# Patient Record
Sex: MALE | Race: WHITE | Employment: OTHER | URBAN - METROPOLITAN AREA
[De-identification: names, ages, dates, MRNs, and addresses within clinical notes are randomized per-mention and may not be internally consistent; named-entity substitution may affect disease eponyms.]

---

## 2024-01-01 ENCOUNTER — LAB REQUISITION (OUTPATIENT)
Dept: LAB | Facility: CLINIC | Age: 74
End: 2024-01-01
Payer: MEDICARE

## 2024-01-01 ENCOUNTER — DOCUMENTATION ONLY (OUTPATIENT)
Dept: GERIATRICS | Facility: CLINIC | Age: 74
End: 2024-01-01
Payer: MEDICARE

## 2024-01-01 ENCOUNTER — TELEPHONE (OUTPATIENT)
Dept: GERIATRICS | Facility: CLINIC | Age: 74
End: 2024-01-01
Payer: MEDICARE

## 2024-01-01 ENCOUNTER — TRANSITIONAL CARE UNIT VISIT (OUTPATIENT)
Dept: GERIATRICS | Facility: CLINIC | Age: 74
End: 2024-01-01
Payer: MEDICARE

## 2024-01-01 VITALS
OXYGEN SATURATION: 95 % | DIASTOLIC BLOOD PRESSURE: 79 MMHG | BODY MASS INDEX: 27.02 KG/M2 | WEIGHT: 193 LBS | HEIGHT: 71 IN | HEART RATE: 85 BPM | RESPIRATION RATE: 18 BRPM | SYSTOLIC BLOOD PRESSURE: 140 MMHG | TEMPERATURE: 98.2 F

## 2024-01-01 DIAGNOSIS — R13.10 DYSPHAGIA, UNSPECIFIED TYPE: ICD-10-CM

## 2024-01-01 DIAGNOSIS — Z85.118 HISTORY OF PRIMARY MALIGNANT NEOPLASM OF LUNG: ICD-10-CM
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
## 2024-01-01 DIAGNOSIS — D64.9 CHRONIC ANEMIA: ICD-10-CM

## 2024-01-01 DIAGNOSIS — G62.9 PERIPHERAL POLYNEUROPATHY: Primary | ICD-10-CM

## 2024-01-01 DIAGNOSIS — J96.11 CHRONIC RESPIRATORY FAILURE WITH HYPOXIA (H): ICD-10-CM

## 2024-01-01 DIAGNOSIS — F10.10 ALCOHOL ABUSE: ICD-10-CM

## 2024-01-01 DIAGNOSIS — R41.82 ALTERED MENTAL STATUS, UNSPECIFIED: ICD-10-CM

## 2024-01-01 DIAGNOSIS — R53.1 GENERALIZED WEAKNESS: ICD-10-CM

## 2024-01-01 DIAGNOSIS — E87.6 HYPOKALEMIA: ICD-10-CM

## 2024-01-01 DIAGNOSIS — F17.200 TOBACCO DEPENDENCE SYNDROME: ICD-10-CM

## 2024-01-01 DIAGNOSIS — I25.10 ATHEROSCLEROTIC HEART DISEASE OF NATIVE CORONARY ARTERY WITHOUT ANGINA PECTORIS: ICD-10-CM

## 2024-01-01 DIAGNOSIS — G62.9 PERIPHERAL POLYNEUROPATHY: ICD-10-CM

## 2024-01-01 DIAGNOSIS — R06.02 SHORTNESS OF BREATH: ICD-10-CM

## 2024-01-01 DIAGNOSIS — R05.1 ACUTE COUGH: ICD-10-CM

## 2024-01-01 DIAGNOSIS — J44.1 COPD WITH ACUTE EXACERBATION (H): ICD-10-CM
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
## 2024-01-01 DIAGNOSIS — F10.20 SEVERE ALCOHOL DEPENDENCE (H): ICD-10-CM

## 2024-01-01 LAB
ALBUMIN UR-MCNC: 30 MG/DL
ALBUMIN UR-MCNC: 30 MG/DL
ANION GAP SERPL CALCULATED.3IONS-SCNC: 11 MMOL/L (ref 7–15)
APPEARANCE UR: ABNORMAL
APPEARANCE UR: CLEAR
BACTERIA UR CULT: ABNORMAL
BILIRUB UR QL STRIP: NEGATIVE
BILIRUB UR QL STRIP: NEGATIVE
BUN SERPL-MCNC: 12.4 MG/DL (ref 8–23)
CALCIUM SERPL-MCNC: 9.2 MG/DL (ref 8.8–10.2)
CHLORIDE SERPL-SCNC: 100 MMOL/L (ref 98–107)
COLOR UR AUTO: ABNORMAL
COLOR UR AUTO: YELLOW
CREAT SERPL-MCNC: 0.64 MG/DL (ref 0.67–1.17)
DEPRECATED HCO3 PLAS-SCNC: 28 MMOL/L (ref 22–29)
EGFRCR SERPLBLD CKD-EPI 2021: >90 ML/MIN/1.73M2
ERYTHROCYTE [DISTWIDTH] IN BLOOD BY AUTOMATED COUNT: 14.2 % (ref 10–15)
GLUCOSE SERPL-MCNC: 99 MG/DL (ref 70–99)
GLUCOSE UR STRIP-MCNC: NEGATIVE MG/DL
GLUCOSE UR STRIP-MCNC: NEGATIVE MG/DL
HCT VFR BLD AUTO: 34.4 % (ref 40–53)
HGB BLD-MCNC: 10.5 G/DL (ref 13.3–17.7)
HGB UR QL STRIP: ABNORMAL
HGB UR QL STRIP: NEGATIVE
HYALINE CASTS: 2 /LPF
KETONES UR STRIP-MCNC: ABNORMAL MG/DL
KETONES UR STRIP-MCNC: ABNORMAL MG/DL
LEUKOCYTE ESTERASE UR QL STRIP: NEGATIVE
LEUKOCYTE ESTERASE UR QL STRIP: NEGATIVE
MCH RBC QN AUTO: 28.8 PG (ref 26.5–33)
MCHC RBC AUTO-ENTMCNC: 30.5 G/DL (ref 31.5–36.5)
MCV RBC AUTO: 94 FL (ref 78–100)
MUCOUS THREADS #/AREA URNS LPF: PRESENT /LPF
NITRATE UR QL: NEGATIVE
NITRATE UR QL: NEGATIVE
PH UR STRIP: 5 [PH] (ref 5–7)
PH UR STRIP: 5.5 [PH] (ref 5–7)
PLATELET # BLD AUTO: 391 10E3/UL (ref 150–450)
POTASSIUM SERPL-SCNC: 4 MMOL/L (ref 3.4–5.3)
RBC # BLD AUTO: 3.65 10E6/UL (ref 4.4–5.9)
RBC URINE: 0 /HPF
RBC URINE: 4 /HPF
SODIUM SERPL-SCNC: 139 MMOL/L (ref 135–145)
SP GR UR STRIP: 1.03 (ref 1–1.03)
SP GR UR STRIP: 1.03 (ref 1–1.03)
SQUAMOUS EPITHELIAL: <1 /HPF
UROBILINOGEN UR STRIP-MCNC: NORMAL MG/DL
UROBILINOGEN UR STRIP-MCNC: NORMAL MG/DL
WBC # BLD AUTO: 8.5 10E3/UL (ref 4–11)
WBC URINE: 1 /HPF
WBC URINE: 2 /HPF

## 2024-01-01 PROCEDURE — 87186 SC STD MICRODIL/AGAR DIL: CPT | Mod: ORL | Performed by: FAMILY MEDICINE

## 2024-01-01 PROCEDURE — 36415 COLL VENOUS BLD VENIPUNCTURE: CPT | Mod: ORL | Performed by: FAMILY MEDICINE

## 2024-01-01 PROCEDURE — 85027 COMPLETE CBC AUTOMATED: CPT | Mod: ORL | Performed by: FAMILY MEDICINE

## 2024-01-01 PROCEDURE — 99310 SBSQ NF CARE HIGH MDM 45: CPT | Performed by: NURSE PRACTITIONER

## 2024-01-01 PROCEDURE — 81001 URINALYSIS AUTO W/SCOPE: CPT | Mod: ORL | Performed by: FAMILY MEDICINE

## 2024-01-01 PROCEDURE — 99305 1ST NF CARE MODERATE MDM 35: CPT | Performed by: FAMILY MEDICINE

## 2024-01-01 PROCEDURE — 87086 URINE CULTURE/COLONY COUNT: CPT | Mod: ORL | Performed by: FAMILY MEDICINE

## 2024-01-01 PROCEDURE — P9604 ONE-WAY ALLOW PRORATED TRIP: HCPCS | Mod: ORL | Performed by: FAMILY MEDICINE

## 2024-01-01 PROCEDURE — 80048 BASIC METABOLIC PNL TOTAL CA: CPT | Mod: ORL | Performed by: FAMILY MEDICINE

## 2024-01-01 RX ORDER — MULTIVITAMIN
1 TABLET ORAL DAILY
COMMUNITY

## 2024-01-01 RX ORDER — LANOLIN ALCOHOL/MO/W.PET/CERES
100 CREAM (GRAM) TOPICAL DAILY
COMMUNITY

## 2024-01-01 RX ORDER — TRIAMCINOLONE ACETONIDE 1 MG/G
CREAM TOPICAL 2 TIMES DAILY
COMMUNITY

## 2024-01-01 RX ORDER — FOLIC ACID 1 MG/1
1 TABLET ORAL DAILY
COMMUNITY

## 2024-01-01 RX ORDER — GABAPENTIN 300 MG/1
900 CAPSULE ORAL 3 TIMES DAILY
COMMUNITY

## 2024-01-01 RX ORDER — TADALAFIL 5 MG/1
5 TABLET ORAL EVERY 24 HOURS
COMMUNITY

## 2024-01-01 RX ORDER — PANTOPRAZOLE SODIUM 40 MG/1
40 TABLET, DELAYED RELEASE ORAL DAILY
COMMUNITY

## 2024-01-01 RX ORDER — MAGNESIUM 200 MG
1000 TABLET ORAL DAILY
COMMUNITY

## 2024-01-01 RX ORDER — ALBUTEROL SULFATE 90 UG/1
2 AEROSOL, METERED RESPIRATORY (INHALATION) EVERY 6 HOURS PRN
COMMUNITY

## 2024-01-01 RX ORDER — GUAIFENESIN 600 MG/1
1200 TABLET, EXTENDED RELEASE ORAL 2 TIMES DAILY
COMMUNITY

## 2024-01-01 RX ORDER — CETIRIZINE HYDROCHLORIDE 10 MG/1
10 TABLET ORAL DAILY
COMMUNITY

## 2024-07-01 PROBLEM — K44.9 DIAPHRAGMATIC HERNIA: Status: ACTIVE | Noted: 2022-12-01

## 2024-07-01 PROBLEM — K31.9 DISORDER OF STOMACH: Status: ACTIVE | Noted: 2022-12-01

## 2024-07-01 PROBLEM — E87.6 HYPOKALEMIA: Status: ACTIVE | Noted: 2024-01-01

## 2024-07-01 PROBLEM — R63.4 ABNORMAL WEIGHT LOSS: Status: ACTIVE | Noted: 2017-09-18

## 2024-07-01 PROBLEM — K86.2 CYST OF PANCREAS: Status: ACTIVE | Noted: 2017-09-18
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
## 2024-07-01 PROBLEM — Z85.118 HISTORY OF PRIMARY MALIGNANT NEOPLASM OF LUNG: Status: ACTIVE | Noted: 2024-01-01

## 2024-07-01 PROBLEM — E83.42 HYPOMAGNESEMIA: Status: ACTIVE | Noted: 2024-01-01

## 2024-07-01 PROBLEM — K22.10 ULCER OF ESOPHAGUS: Status: ACTIVE | Noted: 2022-12-01

## 2024-07-01 PROBLEM — Z80.0 FAMILY HISTORY OF COLON CANCER: Status: ACTIVE | Noted: 2021-08-09

## 2024-07-01 PROBLEM — Z86.0100 HISTORY OF COLONIC POLYPS: Status: ACTIVE | Noted: 2019-02-05

## 2024-07-01 PROBLEM — J96.11 CHRONIC RESPIRATORY FAILURE WITH HYPOXIA (H): Status: ACTIVE | Noted: 2024-01-01

## 2024-07-01 PROBLEM — R74.8 ELEVATED ALKALINE PHOSPHATASE LEVEL: Status: ACTIVE | Noted: 2024-01-01

## 2024-07-01 PROBLEM — F10.10 ALCOHOL ABUSE: Status: ACTIVE | Noted: 2024-01-01

## 2024-07-01 PROBLEM — R93.89 ABNORMAL COMPUTED TOMOGRAPHY SCAN: Status: ACTIVE | Noted: 2017-09-18

## 2024-07-01 PROBLEM — R11.0 NAUSEA: Status: ACTIVE | Noted: 2024-01-01

## 2024-07-01 PROBLEM — G47.9 SLEEP DISTURBANCE: Status: ACTIVE | Noted: 2021-11-08

## 2024-07-01 PROBLEM — J44.1 COPD WITH ACUTE EXACERBATION (H): Status: ACTIVE | Noted: 2024-01-01

## 2024-07-01 PROBLEM — K51.40 INFLAMMATORY PSEUDOTUMOR OF COLON (H): Status: ACTIVE | Noted: 2022-08-11

## 2024-07-01 PROBLEM — I82.611 SUPERFICIAL VENOUS THROMBOSIS OF RIGHT UPPER EXTREMITY: Status: ACTIVE | Noted: 2021-11-19

## 2024-07-01 PROBLEM — L20.9 ATOPIC DERMATITIS: Status: ACTIVE | Noted: 2017-04-07

## 2024-07-01 PROBLEM — H61.009 CHONDRODERMATITIS NODULARIS HELICIS: Status: ACTIVE | Noted: 2017-04-07

## 2024-07-01 PROBLEM — R13.10 DYSPHAGIA: Status: ACTIVE | Noted: 2024-01-01

## 2024-07-01 PROBLEM — F10.20 SEVERE ALCOHOL DEPENDENCE (H): Status: ACTIVE | Noted: 2022-08-10

## 2024-07-01 PROBLEM — N52.9 ERECTILE DYSFUNCTION: Status: ACTIVE | Noted: 2021-08-09

## 2024-07-01 PROBLEM — D64.9 CHRONIC ANEMIA: Status: ACTIVE | Noted: 2024-01-01

## 2024-07-01 PROBLEM — K64.8 HEMORRHOIDS, INTERNAL: Status: ACTIVE | Noted: 2019-08-08

## 2024-07-01 PROBLEM — F17.200 TOBACCO DEPENDENCE SYNDROME: Status: ACTIVE | Noted: 2024-01-01

## 2024-07-01 PROBLEM — R91.1 SOLITARY PULMONARY NODULE: Status: ACTIVE | Noted: 2024-01-01

## 2024-07-01 PROBLEM — D12.2 BENIGN NEOPLASM OF ASCENDING COLON: Status: ACTIVE | Noted: 2019-02-07

## 2024-07-01 PROBLEM — F32.5 MAJOR DEPRESSIVE DISORDER WITH SINGLE EPISODE, IN REMISSION (H): Status: ACTIVE | Noted: 2019-06-24

## 2024-07-01 PROBLEM — C34.90 ADENOCARCINOMA, LUNG (H): Status: ACTIVE | Noted: 2023-06-01

## 2024-07-01 PROBLEM — G62.9 PERIPHERAL NEUROPATHY: Status: ACTIVE | Noted: 2024-01-01

## 2024-07-01 PROBLEM — R53.1 GENERALIZED WEAKNESS: Status: ACTIVE | Noted: 2024-01-01

## 2024-07-01 NOTE — LETTER
7/1/2024      West Wilson  [address]  [address]  [facility] [address]        [facility] GERIATRIC SERVICES       Patient West Wilson  MRN: [id number]        Reason for Visit     Chief Complaint   Patient presents with     Hospital F/U       Code Status     CPR/Full code     Assessment       Generalized weakness/ falls/  Patient admitted post welfare checks  He was found to be crawling on the floor with increasing weakness  Currently reports that his weakness is primarily due to neuropathy.  He is still not able to ambulate    Alcohol use disorder  Patient did not go through any significant alcohol withdrawal and has refused referral to treatment program.  Reports daily heavy alcohol use.  Not interested in any rehab program.  Cont B1 and B12 supplement    Peripheral neuropathy currently on gabapentin in high doses  May use tylenol as needed    Chronic anemia recheck labs    COPD with chronic hypoxic respiratory failure  Continue with oxygen at 3 L.  Cont MDI as ordered    Prior history of nicotine use reports he quit about a month ago.  He does not have any cravings and does not want to use a nicotine patch either    GERD cont PPI    Prior history of lung cancer status post wedge resection of his lung.  He follows up with pulmonary.      History     Patient is a very pleasant 74 year old male who is admitted to TCU  Patient admitted with generalized weakness and history of falls.  There was a welfare check and he was found to be crawling on the floor.  He has underlying history of alcohol use disorder but has refused any interventions.  Declined CD treatment also.  He has been drinking daily as per him at least 4 to 12 ounces of liquor.  Currently discharged for strengthening and rehab to the TCU      Past Medical & Surgical History     Alcohol use disorder  Anemia  COPD    Past Social History     Reviewed,  includes a history of 1 pack a day for 47 years of smoking  Reports he quit smoking a month  ago  Continues to have heavy alcohol use    Family History     Reviewed, and family history is not on file.    Medication List     Current Outpatient Medications   Medication Sig Dispense Refill     albuterol (PROAIR HFA/PROVENTIL HFA/VENTOLIN HFA) 108 (90 Base) MCG/ACT inhaler Inhale 2 puffs into the lungs every 6 hours as needed for shortness of breath, wheezing or cough       cetirizine (ZYRTEC) 10 MG tablet Take 10 mg by mouth daily       cyanocobalamin 1000 MCG sublingual tablet Place 1,000 mcg under the tongue daily       folic acid (FOLVITE) 1 MG tablet Take 1 mg by mouth daily       gabapentin (NEURONTIN) 300 MG capsule Take 900 mg by mouth 3 times daily       guaiFENesin (MUCINEX) 600 MG 12 hr tablet Take 1,200 mg by mouth 2 times daily       multiple vitamin  tablet TABS Take 1 tablet by mouth daily       pantoprazole (PROTONIX) 40 MG EC tablet Take 40 mg by mouth daily       tadalafil (CIALIS) 5 MG tablet Take 5 mg by mouth every 24 hours       thiamine (B-1) 100 MG tablet Take 100 mg by mouth daily       triamcinolone (KENALOG) 0.1 % external cream Apply topically 2 times daily       umeclidinium (INCRUSE ELLIPTA) 62.5 MCG/ACT inhaler Inhale 1 puff into the lungs daily       No current facility-administered medications for this visit.      MED REC REQUIRED  Post Medication Reconciliation Status:  Discharge medications reconciled, continue medications without change       Allergies     Allergies   Allergen Reactions     Penicillins Anaphylaxis       Review of Systems   A comprehensive review of 14 systems was done. Pertinent findings noted here and in history of present illness. All the rest negative.  Constitutional: Negative.  Negative for fever, chills, she has  activity change, appetite change and fatigue.   HENT: Negative for congestion and facial swelling.    Eyes: Negative for photophobia, redness and visual disturbance.   Respiratory: Negative for chest tightness.    On 3 l of O2  Has a chronic  "cough  Cardiovascular: Negative for chest pain, palpitations and leg swelling.   Gastrointestinal: Negative for nausea, diarrhea, constipation, blood in stool and abdominal distention.   Genitourinary: Negative.    Musculoskeletal: reports weakness and fall due to neuropathy  Skin: Negative.    Neurological: Negative for dizziness, tremors, syncope, has weakness, no light-headedness and headaches.   Hematological: Does not bruise/bleed easily.   Psychiatric/Behavioral: Negative.        Physical Exam   BP (!) 140/79   Pulse 85   Temp 98.2  F (36.8  C)   Resp 18   Ht 1.791 m (5' 10.5\")   Wt 87.5 kg (193 lb)   SpO2 95%   BMI 27.30 kg/m     On o2 3L  Constitutional: Oriented to person, place, and time and appears well-developed.   HEENT:  Normocephalic and atraumatic.  Eyes: Conjunctivae and EOM are normal. Pupils are equal, round, and reactive to light. No discharge.  No scleral icterus. Nose normal. Mouth/Throat: Oropharynx is clear and moist. No oropharyngeal exudate.    NECK: Normal range of motion. Neck supple. No JVD present. No tracheal deviation present. No thyromegaly present.   CARDIOVASCULAR: Normal rate, regular rhythm and intact distal pulses.  Exam reveals no gallop and no friction rub.  Systolic murmur present.  PULMONARY: Effort normal and breath sounds normal. No respiratory distress.No Wheezing or rales.  ABDOMEN: Soft. Bowel sounds are normal. No distension and no mass.  There is no tenderness. There is no rebound and no guarding. No HSM.  MUSCULOSKELETAL: Normal range of motion. Mild kyphosis, no tenderness.  Gait is ataxic  Pt is stiff ;reporting pain in legs  LYMPH NODES: Has no cervical, supraclavicular, axillary and groin adenopathy.   NEUROLOGICAL: Alert and oriented to person, place, and time. No cranial nerve deficit.  Normal muscle tone. Coordination normal.   GENITOURINARY: Deferred exam.  SKIN: Skin is warm and dry. No rash noted. No erythema. No pallor.   EXTREMITIES: No cyanosis, " no clubbing, trace edema. No Deformity.  PSYCHIATRIC: Normal mood, affect and behavior.  Appears not very well kept    Lab Results     Discharge lab on 6/27/2024 reviewed.  Creatinine was 0.7  Electrolytes were stable  Hemoglobin 10.7  Platelets 191 K            Electronically signed by    Melyssa Talamantes MD                            Sincerely,        FRANCISCO Perez

## 2024-07-01 NOTE — PROGRESS NOTES
[facility] GERIATRIC SERVICES       Patient West Wilson  MRN: [id number]        Reason for Visit     Chief Complaint   Patient presents with    Hospital F/U       Code Status     CPR/Full code     Assessment       Generalized weakness/ falls/  Patient admitted post welfare checks  He was found to be crawling on the floor with increasing weakness  Currently reports that his weakness is primarily due to neuropathy.  He is still not able to ambulate    Alcohol use disorder  Patient did not go through any significant alcohol withdrawal and has refused referral to treatment program.  Reports daily heavy alcohol use.  Not interested in any rehab program.  Cont B1 and B12 supplement    Peripheral neuropathy currently on gabapentin in high doses  May use tylenol as needed    Chronic anemia recheck labs    COPD with chronic hypoxic respiratory failure  Continue with oxygen at 3 L.  Cont MDI as ordered    Prior history of nicotine use reports he quit about a month ago.  He does not have any cravings and does not want to use a nicotine patch either    GERD cont PPI    Prior history of lung cancer status post wedge resection of his lung.  He follows up with pulmonary.      History     Patient is a very pleasant 74 year old male who is admitted to TCU  Patient admitted with generalized weakness and history of falls.  There was a welfare check and he was found to be crawling on the floor.  He has underlying history of alcohol use disorder but has refused any interventions.  Declined CD treatment also.  He has been drinking daily as per him at least 4 to 12 ounces of liquor.  Currently discharged for strengthening and rehab to the TCU      Past Medical & Surgical History     Alcohol use disorder  Anemia  COPD    Past Social History     Reviewed,  includes a history of 1 pack a day for 47 years of smoking  Reports he quit smoking a month ago  Continues to have heavy alcohol use    Family History     Reviewed, and family history  is not on file.    Medication List     Current Outpatient Medications   Medication Sig Dispense Refill    albuterol (PROAIR HFA/PROVENTIL HFA/VENTOLIN HFA) 108 (90 Base) MCG/ACT inhaler Inhale 2 puffs into the lungs every 6 hours as needed for shortness of breath, wheezing or cough      cetirizine (ZYRTEC) 10 MG tablet Take 10 mg by mouth daily      cyanocobalamin 1000 MCG sublingual tablet Place 1,000 mcg under the tongue daily      folic acid (FOLVITE) 1 MG tablet Take 1 mg by mouth daily      gabapentin (NEURONTIN) 300 MG capsule Take 900 mg by mouth 3 times daily      guaiFENesin (MUCINEX) 600 MG 12 hr tablet Take 1,200 mg by mouth 2 times daily      multiple vitamin  tablet TABS Take 1 tablet by mouth daily      pantoprazole (PROTONIX) 40 MG EC tablet Take 40 mg by mouth daily      tadalafil (CIALIS) 5 MG tablet Take 5 mg by mouth every 24 hours      thiamine (B-1) 100 MG tablet Take 100 mg by mouth daily      triamcinolone (KENALOG) 0.1 % external cream Apply topically 2 times daily      umeclidinium (INCRUSE ELLIPTA) 62.5 MCG/ACT inhaler Inhale 1 puff into the lungs daily       No current facility-administered medications for this visit.      MED REC REQUIRED  Post Medication Reconciliation Status:  Discharge medications reconciled, continue medications without change       Allergies     Allergies   Allergen Reactions    Penicillins Anaphylaxis       Review of Systems   A comprehensive review of 14 systems was done. Pertinent findings noted here and in history of present illness. All the rest negative.  Constitutional: Negative.  Negative for fever, chills, she has  activity change, appetite change and fatigue.   HENT: Negative for congestion and facial swelling.    Eyes: Negative for photophobia, redness and visual disturbance.   Respiratory: Negative for chest tightness.    On 3 l of O2  Has a chronic cough  Cardiovascular: Negative for chest pain, palpitations and leg swelling.   Gastrointestinal: Negative  "for nausea, diarrhea, constipation, blood in stool and abdominal distention.   Genitourinary: Negative.    Musculoskeletal: reports weakness and fall due to neuropathy  Skin: Negative.    Neurological: Negative for dizziness, tremors, syncope, has weakness, no light-headedness and headaches.   Hematological: Does not bruise/bleed easily.   Psychiatric/Behavioral: Negative.        Physical Exam   BP (!) 140/79   Pulse 85   Temp 98.2  F (36.8  C)   Resp 18   Ht 1.791 m (5' 10.5\")   Wt 87.5 kg (193 lb)   SpO2 95%   BMI 27.30 kg/m     On o2 3L  Constitutional: Oriented to person, place, and time and appears well-developed.   HEENT:  Normocephalic and atraumatic.  Eyes: Conjunctivae and EOM are normal. Pupils are equal, round, and reactive to light. No discharge.  No scleral icterus. Nose normal. Mouth/Throat: Oropharynx is clear and moist. No oropharyngeal exudate.    NECK: Normal range of motion. Neck supple. No JVD present. No tracheal deviation present. No thyromegaly present.   CARDIOVASCULAR: Normal rate, regular rhythm and intact distal pulses.  Exam reveals no gallop and no friction rub.  Systolic murmur present.  PULMONARY: Effort normal and breath sounds normal. No respiratory distress.No Wheezing or rales.  ABDOMEN: Soft. Bowel sounds are normal. No distension and no mass.  There is no tenderness. There is no rebound and no guarding. No HSM.  MUSCULOSKELETAL: Normal range of motion. Mild kyphosis, no tenderness.  Gait is ataxic  Pt is stiff ;reporting pain in legs  LYMPH NODES: Has no cervical, supraclavicular, axillary and groin adenopathy.   NEUROLOGICAL: Alert and oriented to person, place, and time. No cranial nerve deficit.  Normal muscle tone. Coordination normal.   GENITOURINARY: Deferred exam.  SKIN: Skin is warm and dry. No rash noted. No erythema. No pallor.   EXTREMITIES: No cyanosis, no clubbing, trace edema. No Deformity.  PSYCHIATRIC: Normal mood, affect and behavior.  Appears not very " well kept    Lab Results     Discharge lab on 6/27/2024 reviewed.  Creatinine was 0.7  Electrolytes were stable  Hemoglobin 10.7  Platelets 191 K            Electronically signed by    Melyssa Talamantes MD

## 2024-07-03 NOTE — TELEPHONE ENCOUNTER
[facility] Triage Nurse Telephone Encounter    Provider: JONEL Buck CNP  Facility: [facility]  Facility Type:  TCU    Caller: Alexus   Call Back Number: [phone]    Allergies:    Allergies   Allergen Reactions    Penicillins Anaphylaxis        Reason for call:   Family is concerned that the patient is confused today and does not know where he is. He is having some urinary frequency. /94 heart rate 98 oxygen 91% on 3 L temp 97.5 nursing requesting UA UC.     Verbal Order/Direction given by Provider: Obtain a UA/UC   CBC and BMP on 7/5 for confusion     Provider giving Order:  Melyssa Talamantes MD    Verbal Order given to: Alexus Valdes RN

## 2024-07-10 NOTE — PROGRESS NOTES
[facility] GERIATRICS    PRIMARY CARE PROVIDER AND CLINIC:  Provider Outside, No address on file  No chief complaint on file.     [place] Medical Record Number:  [id number]  Place of Service where encounter took place:  [facility] (U) [[id number]]    Hospital Course     West Wilson is a 74-year-old male with history of alcohol use, peripheral neuropathy, anemia, COPD with chronic hypoxic respiratory failure who presented to [facility] ED via EMS after a welfare check was called, admitted for evaluation of falls and weakness in setting of alcohol use.     Patient endorsed falling multiple times over the past few weeks, with increasing frequency in the 3 days prior to presentation. He noted his legs would feel weak and give out from under him. Eventually, he was too weak to get up and was crawling around on the floor. Of note, he drinks hard alcohol daily. His last drink was 6/25/2024 prior to arrival to the ED ~4pm. He has never experienced alcohol withdrawal seizures.     His ED workup revealed a magnesium of 1.1, K of 3.1, Hgb of 10.7, Vitamin B12 of 1320, UA obtained in ED. CK was 432. Head CT and CT of spine were negative. He was given electrolyte replacement and 1 L of NS fluids for hydration. He was admitted to observation.     Electrolytes normalized with supplementation. CK normalized with IV fluids.     He did not experience any significant alcohol withdrawal symptoms and declined referral for CD treatment. He continues to experience poorly controlled peripheral neuropathy despite recently increased dose of gabapentin. Discussed importance of cessation from alcohol.     PT and OT assessed patient and recommended discharge to TCU due to significant weakness. He will discharge to [facility] for ongoing therapy. Continue all previous home medications and follow up with PCP.       West Wilson  is a 74 year old  (1950), admitted to the above facility from  [facility].  Hospital stay 6/25/24 through 6/28/24..   Patient seen for initial NP visit in TCU today for ongoing medical concerns:  1. Fall, subsequent encounter    2. Generalized weakness    3. Chronic anemia    4. Chronic respiratory failure with hypoxia (H)    5. Peripheral polyneuropathy    6. Alcohol abuse    7. COPD with acute exacerbation (H)    8. Dysphagia, unspecified type    9. History of primary malignant neoplasm of lung    10. Hypokalemia    11. Acute cough          HPI:    Patient with increased weakness, deep, wet cough today. This is new. Admitted for falls/weakness after found on welfare check. History of alcohol use, COPD on home oxygen.   Denies HA, chest pain, palpitations, dizziness. HR elevated this morning, 114. BP stable so far in TCU. Reports some SOB, O2 this morning 83% on oxygen, coughing up phlegm, no Concerns with urination, no constipation, no pain. He does have a history of dysphagia, will ask speech therapy to see him. Reports no appetite. Sleeping okay. Reports not getting much therapy.   Lives at home alone in apartment, ambulates with walker.      CODE STATUS/ADVANCE DIRECTIVES DISCUSSION:  No Order  CPR/Full code   ALLERGIES:   Allergies   Allergen Reactions    Penicillins Anaphylaxis      PAST MEDICAL HISTORY: No past medical history on file.   PAST SURGICAL HISTORY:   has no past surgical history on file.  FAMILY HISTORY: family history is not on file.  SOCIAL HISTORY:     Patient's living condition: lives alone    Post Discharge Medication Reconciliation Status:   MED REC REQUIRED  Post Medication Reconciliation Status: discharge medications reconciled and changed, per note/orders       Current Outpatient Medications   Medication Sig Dispense Refill    albuterol (PROAIR HFA/PROVENTIL HFA/VENTOLIN HFA) 108 (90 Base) MCG/ACT inhaler Inhale 2 puffs into the lungs every 6 hours as needed for shortness of breath, wheezing or cough      cetirizine (ZYRTEC) 10 MG tablet Take 10 mg by mouth  daily      cyanocobalamin 1000 MCG sublingual tablet Place 1,000 mcg under the tongue daily      folic acid (FOLVITE) 1 MG tablet Take 1 mg by mouth daily      gabapentin (NEURONTIN) 300 MG capsule Take 900 mg by mouth 3 times daily      guaiFENesin (MUCINEX) 600 MG 12 hr tablet Take 1,200 mg by mouth 2 times daily      multiple vitamin  tablet TABS Take 1 tablet by mouth daily      pantoprazole (PROTONIX) 40 MG EC tablet Take 40 mg by mouth daily      tadalafil (CIALIS) 5 MG tablet Take 5 mg by mouth every 24 hours      thiamine (B-1) 100 MG tablet Take 100 mg by mouth daily      triamcinolone (KENALOG) 0.1 % external cream Apply topically 2 times daily      umeclidinium (INCRUSE ELLIPTA) 62.5 MCG/ACT inhaler Inhale 1 puff into the lungs daily       No current facility-administered medications for this visit.       ROS:  10 point ROS of systems including Constitutional, Eyes, Respiratory, Cardiovascular, Gastroenterology, Genitourinary, Integumentary, Musculoskeletal, Psychiatric were all negative except for pertinent positives noted in my HPI.    Vitals:    /88, R 18, T 97.4, P 114, O2 91% on oxygen, it was 83% on oxygen this morning, Wt 190.4 pounds, pain 2    Exam:  GENERAL APPEARANCE:  Alert, oriented, cooperative, appears to have a hard time taking deep breath  ENT:  Mouth and posterior oropharynx normal, moist mucous membranes  EYES:  EOM, conjunctivae, lids, pupils and irises normal  NECK:  No adenopathy,masses or thyromegaly  RESP:  deep, wet congestive cough, lungs with crackles worse on the right than left.   CV:  Palpation and auscultation of heart done , regular rate and rhythm, no murmur, rub, or gallop, no edema to LE  GI/ABDOMEN:  normal bowel sounds, soft, nontender, no hepatosplenomegaly or other masses  M/S:   moves extremities spontaneously, having a hard time sitting up and rotating   SKIN:  no rashes to exposed skin.   NEURO:   intact   PSYCH:  oriented X 3, normal insight, judgement and  memory, affect and mood normal,       Lab/Diagnostic data:  CT HEAD BRAIN WO    Result Date: 6/25/2024  HEAD CT: 1. No CT evidence for acute intracranial process. 2. Brain atrophy and presumed chronic microvascular ischemic changes as above.     CERVICAL SPINE CT: 1. No CT evidence for acute fracture or post traumatic subluxation. 2. Multilevel cervical spondylosis without high-grade spinal canal stenosis. Multilevel neural foraminal stenosis as detailed above.    CT SPINE CERVICAL WO    Result Date: 6/25/2024  HEAD CT: 1. No CT evidence for acute intracranial process. 2. Brain atrophy and presumed chronic microvascular ischemic changes as above.     CERVICAL SPINE CT: 1. No CT evidence for acute fracture or post traumatic subluxation. 2. Multilevel cervical spondylosis without high-grade spinal canal stenosis. Multilevel neural foraminal stenosis as detailed above.         BMP CBC LFTs / Coags     06/26/24  1510 06/27/24  0753   SODIUM -- 141   POTASSIUM 4.1 3.8   CHLORIDE -- 102   IK7OEGRM -- 29   ANIONGAP -- 10   BUN -- 8   CREATININE -- 0.71   GLUCOSE -- 86   CALCIUM -- 8.7*   MAGNESIUM -- 1.2*       Cardiopulmonary Infection / Inflammatory Endocrine     06/26/24  1510        Reviewed by me today.     ASSESSMENT/PLAN:  (W19.XXXD) Fall, subsequent encounter  (primary encounter diagnosis)  (R53.1) Generalized weakness  Comment: PTA gabapentin 900 TID, fall at home, he was too weak to get up and was crawling around on the floor. Of note, he drinks hard alcohol daily. His last drink was 6/25/2024 prior to arrival to the ED ~4pm.    Head CT and CT of spine were negative. He was given electrolyte replacement and 1 L of NS fluids for hydration.   BMP WNL on 7/5, reviewed  Appears to be declining in TCU, still unable to walk, appears very weak today. see below  Plan: PT/OT, recheck labs 7/11    (D64.9) Chronic anemia  Comment: in setting of alcohol use, Hgb 10.5 on 7/5  Plan: Continue to monitor     (J96.11)  Chronic respiratory failure with hypoxia (H)  (J44.1) COPD with acute exacerbation (H)  (R13.10) Dysphagia, unspecified type  (R05.1) Acute cough  (Z85.118) History of primary malignant neoplasm of lung  Comment: Deep, wet congestive cough today, crackles bilaterally. Record review with history of dysphagia, O2 drop today to 83% on oxygen.   Plan: CXR, IS q 2 hours while awake, labs 7/11, place eval to ST, start guaifenesin Q 4 hours PRN, lasix 20mg po x 3 days.     (G62.9) Peripheral polyneuropathy  Comment: PTA gabapentin  Plan: continue     (F10.10) Alcohol abuse  Patient did not go through any significant alcohol withdrawal and has refused referral to treatment program.  Reports daily heavy alcohol use.  Not interested in any rehab program.  Continue thiamine, folic acid and MVT    (E87.6) Hypokalemia  Comment: 3.1 inpatient > WNL on 7/5.   Plan: recheck 7/11    GERD   cont PPI     Prior history of lung cancer status post wedge resection of his lung.  He follows up with pulmonary.  Holding tadalafil while in TCU          Electronically signed by:  Edel Geronimo CNP   July 10, 2024      Total time greater than 55 minutes reviewing chart in EPIC and PCC, medications, labs, vitals. Discussing with social work, physical therapy, occupational therapy and nursing.

## 2024-09-13 ENCOUNTER — MEDICAL CORRESPONDENCE (OUTPATIENT)
Dept: HEALTH INFORMATION MANAGEMENT | Facility: CLINIC | Age: 74
End: 2024-09-13
Payer: MEDICARE